# Patient Record
Sex: MALE | Race: WHITE | NOT HISPANIC OR LATINO | Employment: OTHER | URBAN - NONMETROPOLITAN AREA
[De-identification: names, ages, dates, MRNs, and addresses within clinical notes are randomized per-mention and may not be internally consistent; named-entity substitution may affect disease eponyms.]

---

## 2021-01-26 ENCOUNTER — TRANSCRIBE ORDERS (OUTPATIENT)
Dept: PHYSICAL THERAPY | Facility: CLINIC | Age: 56
End: 2021-01-26

## 2021-01-26 DIAGNOSIS — I63.9 CEREBROVASCULAR ACCIDENT (CVA), UNSPECIFIED MECHANISM (HCC): Primary | ICD-10-CM

## 2021-01-28 ENCOUNTER — OFFICE VISIT (OUTPATIENT)
Dept: PHYSICAL THERAPY | Facility: CLINIC | Age: 56
End: 2021-01-28

## 2021-01-28 DIAGNOSIS — I63.9 CEREBROVASCULAR ACCIDENT (CVA), UNSPECIFIED MECHANISM (HCC): Primary | ICD-10-CM

## 2021-01-28 PROCEDURE — 92523 SPEECH SOUND LANG COMPREHEN: CPT | Performed by: SPEECH-LANGUAGE PATHOLOGIST

## 2021-01-28 NOTE — PROGRESS NOTES
"Outpatient Speech Language Pathology   Adult Speech Language Cognitive Initial Evaluation       Patient Name: Edgardo Stein  : 1965  MRN: [id number]  Today's Date: 2021        Visit Date: 2021   There is no problem list on file for this patient.       No past medical history on file.     No past surgical history on file.      Visit Dx:    ICD-10-CM ICD-9-CM   1. Cerebrovascular accident (CVA), unspecified mechanism (CMS/[facility])  I63.9 434.91     Edgardo Stein is seen at [facility] [place] to participate in a formal s/l and cognitive evaluation to assess safety/function in adls. Pt is positioned upright in chair to cooperatively participate. Pt is seen post CVA on . Pt reports he was in surgery for hip replacement when CVA occurred. Pt remained in the hospital until discharged on . Pt recently, “two days ago”, had a procedure on his heart. Pt reports no concerns w/ eating/drinking. He reports noticing changes in comprehension, increased processing time, and word finding at conversation level. All results and observations of this evaluation are felt to be representative of pt's current functional status.    Receptive language skills are intact. Pt is able to id common objects and personal body parts, follow simple and multi-step directives, understand complex \"wh\" questions and participate in conversational exchange w/o difficulties.    Expressive language skills are mildly impaired. Pt is able to complete automatic speech tasks, complete complex oe sentences, repeat at word/sentence and multiple digit levels WFLs. Pt noted w/ mild impairment in confrontational naming tasks, responding to complete oe \"wh\" questions, and word finding at conversation level. Increased processing time needed to participate in complex conversational exchange. Mild expressive aphasia noted.     Pt is independently oriented to person, place and time. Immediate and LTM are " wfl w/ pt recalling recent adls and providing personal information w/o delays. STM mildly impaired w/ pt difficulty in recalling information from short paragraphs. Pt able to recall most information w/ increased processing time. Thought organization, processing and problem solving are mildly impaired w/ pt demonstrating difficulty in convergent and divergent thinking skills and increased processing time needed in conversation. However, pt noted w/ appropriate comparing/contrasting, understanding of idiomatic language,    Facial/oral structures are symmetrical upon observation. Oral mucosa are moist, pink and clean. Secretions are clear, thin and well controlled. Speech intensity, clarity and intelligibility are wfl w/o dysarthria or oral apraxia noted.    Graphic and reading skills are intact, premorbid baseline status. Pt is able to id isolated letters, simple and moderate words, as well as sentences and small paragraphs. Signature is legible. Pt reports he “noticed a difference in handwriting right after stroke, however has improved”.     Pragmatic skills are wfl w/ appropriate eye gaze patterns and visual tracking. Language is appropriate in context w/ topic initiation and maintenance independently. No left neglect evidenced. Humor response is intact w/ appropriate affect.    Functional Tool: Speech Language Cognitive Evaluation stimuli derived from [place], BDAE, SCATBI, Standard measures, and [facility]    Impression: Per these evaluation results, Mr. Stein presents w/ mild Expressive aphasia.     Recommendations: Pt is recommended to participate in formal tx to address processing time, deficits w/ STM, and word finding.      Long Term Goal:  1. Patient will improve expressive language skills to allow for maximal functional communication and independence in adls.        Short Term Goals:  1. Pt will improve word finding in conversation 3/5 opp w/ min cues over 3 consecutive sessions. .   2. Pt will name  objects/verbs per verbal description w/ 80% acc given min cues over 3 consecutive sessions.  3. Pt will recall information/ answer WH Q's from 3-5 sentence paragraph/conversation w/ 80% acc w/ min cues.  4. Pt will decrease processing time needed to participate in conversation w/ min cues.   5. Pt will complete convergent/divergent abstract/concrete naming/thinking tasks in 3/5 opp given min cues over 3 consecutive sessions.  6. Pt will accurately provide definition/ multiple meanings of target word w/ 80% acc w/ min cues.   *goals may be added modified pending progress towards this poc.     D/w pt results and recommendations w/ verbal understanding and agreement.    Thank you,  Lorene Irving M.A., CCC-SLP      SLP [facility] [place] [facility] - 01/28/21 1400        Communication Assessment/Intervention    Document Type  evaluation   -    Patient Observations  alert;cooperative;agree to therapy   -       Comprehension Assessment/Intervention    Comprehension Assessment/Intervention  Auditory Comprehension   -MF       Auditory Comprehension Assessment/Intervention    Auditory Comprehension (Communication)  WFL   -MF    Able to Identify Objects/Pictures (Communication)  WNL   -MF    Answers Questions (Communication)  WFL;yes/no;personal;simple   -MF    Able to Follow Commands (Communication)  2-step;body part;WFL   -MF    Narrative Discourse  simple paragraph level;conversational level;WFL   -MF       Expression Assessment/Intervention    Expression Assessment/Intervention  verbal expression   -MF       Verbal Expression Assessment/Intervention    Verbal Expression  mild impairment   -MF    Automatic Speech (Communication)  response to greeting;counting 1-20;WNL   -MF    Repetition  WNL   -MF    Phrase Completion  WNL   -MF    Responsive Naming  WFL   -MF    Confrontational Naming  mild impairment   -MF    Spontaneous/Functional Words  WFL   -MF    Sentence Formulation  mild impairment   -MF    Conversational Discourse/Fluency   WFL   -MF    Verbal Expression, Comment  mild impairment w/ word finding, and STM   -MF      User Key  (r) = Recorded By, (t) = Taken By, (c) = Cosigned By    Initials Name Provider Type    Lorene Kelley CCC-SLP Speech and Language Pathologist              OP SLP Assessment/Plan - 01/28/21 1400        SLP Assessment    Functional Problems  Speech Language- Adult/Cognition   -    Impact on Function: Adult Speech Language/Cognition  Difficulty communicating wants, needs, and ideas;Difficulty communicating in a medical emergency;Restrictions in personal and social life;Difficulty sequencing thoughts to express complex messages;Difficulty sequencing or problem solving to complete ADLs   -    Clinical Impression: Speech Language-Adult/Congnition  Mild:;Expressive Aphasia   -MF    Please refer to paper survey for additional self-reported information  Yes   -MF    Please refer to items scanned into chart for additional diagnostic informaiton and handouts as provided by clinician  Yes   -MF    SLP Diagnosis  Mild:;Expressive Aphasia   -MF    Prognosis  Good (comment)   -MF    Patient/caregiver participated in establishment of treatment plan and goals  Yes   -MF    Patient would benefit from skilled therapy intervention  Yes   -MF       SLP Plan    Frequency  12 visits   -    Duration  x12 weeks   -MF    Planned CPT's?  SLP INDIVIDUAL SPEECH THERAPY: 49836   -    Plan Comments  Evaluation Complete, Initiate POC   -MF      User Key  (r) = Recorded By, (t) = Taken By, (c) = Cosigned By    Initials Name Provider Type    Lorene Kelley CCC-SLP Speech and Language Pathologist              Lorene Irving CCC-DEYVI  1/28/2021

## 2021-02-03 ENCOUNTER — TREATMENT (OUTPATIENT)
Dept: PHYSICAL THERAPY | Facility: CLINIC | Age: 56
End: 2021-02-03

## 2021-02-03 DIAGNOSIS — I63.9 CEREBROVASCULAR ACCIDENT (CVA), UNSPECIFIED MECHANISM (HCC): Primary | ICD-10-CM

## 2021-02-03 PROCEDURE — 92507 TX SP LANG VOICE COMM INDIV: CPT | Performed by: SPEECH-LANGUAGE PATHOLOGIST

## 2021-02-03 NOTE — PROGRESS NOTES
"Outpatient Speech Language Pathology   Adult Speech Language Cognitive Treatment Note       Patient Name: Edgardo Stein  : 1965  MRN: [id number]  Today's Date: 2/3/2021         Visit Date: 2021   There is no problem list on file for this patient.         Visit Dx:    ICD-10-CM ICD-9-CM   1. Cerebrovascular accident (CVA), unspecified mechanism (CMS/HCC)  I63.9 434.91     Long Term Goal:  1. Patient will improve expressive language skills to allow for maximal functional communication and independence in adls.          Short Term Goals:  1. Pt will improve word finding in conversation 3/5 opp w/ min cues over 3 consecutive sessions.    *Pt improves word finding in structured therapy activities in 3/5 opp w/ mod cues. Pt notes difficulty w/ conversation \"occasionally\".    2. Pt will name objects/verbs per verbal description w/ 80% acc given min cues over 3 consecutive sessions.   *Pt named object per verbal description w/ 75% w/ min cues.     3. Pt will recall information/ answer WH Q's from 3-5 sentence paragraph/conversation w/ 80% acc w/ min cues.   *Pt recalled information to answer WH Q's from 3/5 sentence paragraph w/ 70% acc w/ min-mod cues.     4. Pt will decrease processing time needed to participate in conversation w/ min cues.    *Pt decreased processing time at conversation level w/ mod cues. Pt reports \"it is getting easier\".    5. Pt will complete convergent/divergent abstract/concrete naming/thinking tasks in 3/5 opp given min cues over 3 consecutive sessions.   *Pt completed convergent/divergent naming tasks w/ broad category 13 words in 30 seconds, narrow category 6 in 30 seconds w/ mod cues.     6. Pt will accurately provide definition/ multiple meanings of target word w/ 80% acc w/ min cues.    *Pt provided definition/ multiple meanings of target words w/ 90% acc w/ min cues    *goals may be added modified pending progress towards this poc.     D/w pt progress made this session w/ " verbal understanding and agreement. Pt encouraged to watch tv and try to retell the story to  to increase ability to recall information/ STM.     Thank you,      Lorene Irving CCC-SLP  2/3/2021

## 2021-02-10 ENCOUNTER — TREATMENT (OUTPATIENT)
Dept: PHYSICAL THERAPY | Facility: CLINIC | Age: 56
End: 2021-02-10

## 2021-02-10 DIAGNOSIS — I63.9 CEREBROVASCULAR ACCIDENT (CVA), UNSPECIFIED MECHANISM (HCC): Primary | ICD-10-CM

## 2021-02-10 PROCEDURE — 92507 TX SP LANG VOICE COMM INDIV: CPT | Performed by: SPEECH-LANGUAGE PATHOLOGIST

## 2021-02-10 NOTE — PROGRESS NOTES
"Outpatient Speech Language Pathology   Adult Speech Language Cognitive Treatment Note       Patient Name: Edgardo Stein  : 1965  MRN: [id number]  Today's Date: 2/10/2021         Visit Date: 02/10/2021   There is no problem list on file for this patient.         Visit Dx:    ICD-10-CM ICD-9-CM   1. Cerebrovascular accident (CVA), unspecified mechanism (CMS/HCC)  I63.9 434.91     Long Term Goal:  1. Patient will improve expressive language skills to allow for maximal functional communication and independence in adls.           Short Term Goals:  1. Pt will improve word finding in conversation 3/5 opp w/ min cues over 3 consecutive sessions.               *Pt improves word finding in structured therapy activities in 3/5 opp w/ mod cues. Pt notes difficulty w/ conversation \"occasionally\".     2. Pt will name objects/verbs per verbal description w/ 80% acc given min cues over 3 consecutive sessions. (1 session)              *Pt named object per verbal description w/ 95% w/ min cues.      3. Pt will recall information/ answer WH Q's from 3-5 sentence paragraph/conversation w/ 80% acc w/ min cues.              *Pt recalled information to answer WH Q's from 3/5 sentence paragraph w/ 70% acc w/ min-mod cues. Pt often uses written words/ pictures to help remember key details of story/ advertisement. Pt reports \"reading comprehension was never something I was great with\".      4. Pt will decrease processing time needed to participate in conversation w/ min cues.               *Pt decreased processing time at conversation level w/ mod cues. Pt reports \"it is getting easier\".     5. Pt will complete convergent/divergent abstract/concrete naming/thinking tasks in 3/5 opp given min cues over 3 consecutive sessions.              *Pt completed convergent/divergent naming tasks w/ broad category 15 words in 30 seconds, narrow category 12 in 30 seconds w/ min cues.      6. Pt will accurately provide definition/ multiple " meanings of target word w/ 80% acc w/ min cues. (2 sessions)              *Pt provided definition/ multiple meanings of target words w/ 90% acc w/ min cues     *goals may be added modified pending progress towards this poc.     D/w pt progress made this session w/ verbal understanding and agreement. Pt encouraged to watch tv and try to retell the story to  to increase ability to recall information/ STM.     Thank you,           Lorene Irving CCC-SLP  2/10/2021

## 2021-02-24 ENCOUNTER — TREATMENT (OUTPATIENT)
Dept: PHYSICAL THERAPY | Facility: CLINIC | Age: 56
End: 2021-02-24

## 2021-02-24 DIAGNOSIS — I63.9 CEREBROVASCULAR ACCIDENT (CVA), UNSPECIFIED MECHANISM (HCC): Primary | ICD-10-CM

## 2021-02-24 PROCEDURE — 92507 TX SP LANG VOICE COMM INDIV: CPT | Performed by: SPEECH-LANGUAGE PATHOLOGIST

## 2021-02-24 NOTE — PROGRESS NOTES
"Outpatient Speech Language Pathology   Peds Speech Language Treatment Note       Patient Name: Edgardo Stein  : 1965  MRN: [id number]  Today's Date: 2021      Visit Date: 2021    There is no problem list on file for this patient.      Visit Dx:    ICD-10-CM ICD-9-CM   1. Cerebrovascular accident (CVA), unspecified mechanism (CMS/HCC)  I63.9 434.91     Long Term Goal:  1. Patient will improve expressive language skills to allow for maximal functional communication and independence in adls.           Short Term Goals:  1. Pt will improve word finding in conversation 3/5 opp w/ min cues over 3 consecutive sessions. (1 session)              *Pt improves word finding in structured therapy activities in 4/5 opp w/ min cues. Pt notes increase in ability to process conversations and respond quickly. He reports \"I'm able to respond faster\".     2. Pt will name objects/verbs per verbal description w/ 80% acc given min cues over 3 consecutive sessions. (2 session)              *Pt named object per verbal description w/ 95% w/ min cues.      3. Pt will recall information/ answer WH Q's from 3-5 sentence paragraph/conversation w/ 80% acc w/ min cues.              *Pt recalled information to answer WH Q's from 3/5 sentence paragraph w/ 75% acc w/ min-mod cues. Pt often uses written words/ pictures to help remember key details of story/advertisement. Pt reports \"I can remember better when I read the paragraph myself\".      4. Pt will decrease processing time needed to participate in conversation w/ min cues. (1 session)              *Pt decreased processing time at conversation level w/ min cues. Pt notes increase in ability to process conversations and respond quickly. He reports \"I'm able to respond faster\".     5. Pt will complete convergent/divergent abstract/concrete naming/thinking tasks in 3/5 opp given min cues over 3 consecutive sessions.              *Pt completed convergent/divergent naming tasks w/ " broad category 15 words in 30 seconds, narrow category 15 in 30 seconds w/ min cues.      6. Pt will accurately provide definition/ multiple meanings of target word w/ 80% acc w/ min cues. (GOAL MET)              *Pt provided definition/ multiple meanings of target words w/ 90% acc w/ min cues     *goals may be added modified pending progress towards this poc.     D/w pt progress made this session w/ verbal understanding and agreement. Pt encouraged to watch tv and try to retell the story to  to increase ability to recall information/ STM.     Thank you,        Lorene Irving, CCC-SLP  2/24/2021

## 2021-03-03 ENCOUNTER — TREATMENT (OUTPATIENT)
Dept: PHYSICAL THERAPY | Facility: CLINIC | Age: 56
End: 2021-03-03

## 2021-03-03 DIAGNOSIS — I63.9 CEREBROVASCULAR ACCIDENT (CVA), UNSPECIFIED MECHANISM (HCC): Primary | ICD-10-CM

## 2021-03-03 PROCEDURE — 92507 TX SP LANG VOICE COMM INDIV: CPT | Performed by: SPEECH-LANGUAGE PATHOLOGIST

## 2021-03-03 NOTE — PROGRESS NOTES
"Outpatient Speech Language Pathology   Adult Speech Language Cognitive Progress Note       Patient Name: Edgardo Stein  : 1965  MRN: [id number]  Today's Date: 3/3/2021         Visit Date: 2021   There is no problem list on file for this patient.         Visit Dx:    ICD-10-CM ICD-9-CM   1. Cerebrovascular accident (CVA), unspecified mechanism (CMS/HCC)  I63.9 434.91     Long Term Goal:  1. Patient will improve expressive language skills to allow for maximal functional communication and independence in adls.           Short Term Goals:  1. Pt will improve word finding in conversation 3/5 opp w/ min cues over 3 consecutive sessions. (2 session)              *Pt improves word finding in structured therapy activities in 4/5 opp w/ min cues. Pt notes increase in ability to process conversations and respond quickly. He reports \"I'm able to respond faster\".     2. Pt will name objects/verbs per verbal description w/ 80% acc given min cues over 3 consecutive sessions. (GOAL MET)              *Pt named object per verbal description w/ 95% w/ min cues.      3. Pt will recall information/ answer WH Q's from 3-5 sentence paragraph/conversation w/ 80% acc w/ min cues.              *Pt recalled information to answer WH Q's from 3/5 sentence paragraph w/ 80% acc w/ min-mod cues. Pt often uses written words/ pictures to help remember key details of story/advertisement. Pt reports \"I can remember better when I read the paragraph myself\".      4. Pt will decrease processing time needed to participate in conversation w/ min cues. (2 session)              *Pt decreased processing time at conversation level w/ min cues. Pt notes increase in ability to process conversations and respond quickly. He reports \"I'm able to respond faster\".     5. Pt will complete convergent/divergent abstract/concrete naming/thinking tasks in 3/5 opp given min cues over 3 consecutive sessions. (2 sessions)              *Pt completed " convergent/divergent naming tasks w/ broad category 15 words in 30 seconds, narrow category 15 in 30 seconds w/ min cues.        *goals may be added modified pending progress towards this poc.     D/w pt progress made this session w/ verbal understanding and agreement. Pt encouraged to watch tv and try to retell the story to  to increase ability to recall information/ STM.     Thank you,        SLP [facility] Evaluation - 03/03/21 0900        Communication Assessment/Intervention    Patient Observations  alert;cooperative;agree to therapy   -MF       Comprehension Assessment/Intervention    Comprehension Assessment/Intervention  Auditory Comprehension   -MF       Auditory Comprehension Assessment/Intervention    Auditory Comprehension (Communication)  WFL   -MF    Able to Identify Objects/Pictures (Communication)  WNL   -MF    Answers Questions (Communication)  yes/no;wh questions;personal;WFL   -MF    Able to Follow Commands (Communication)  2-step;3-step;body part;objects;WFL   -MF    Narrative Discourse  simple paragraph level;conversational level;WFL   -MF       Expression Assessment/Intervention    Expression Assessment/Intervention  verbal expression   -MF       Verbal Expression Assessment/Intervention    Verbal Expression  mild impairment   -MF    Automatic Speech (Communication)  response to greeting;alphabet;counting 1-20;WNL   -MF    Repetition  WNL   -MF    Phrase Completion  WNL   -MF    Responsive Naming  WNL   -MF    Confrontational Naming  WFL   -MF    Spontaneous/Functional Words  WFL   -MF    Sentence Formulation  mild impairment   -MF    Conversational Discourse/Fluency  WFL   -MF    Verbal Expression, Comment  mild impairment w/ word finding, and STM   -MF      User Key  (r) = Recorded By, (t) = Taken By, (c) = Cosigned By    Initials Name Provider Type    Lorene Kelley CCC-SLP Speech and Language Pathologist              OP SLP Assessment/Plan - 03/03/21 0900        SLP Assessment     Functional Problems  Speech Language- Adult/Cognition   -    Impact on Function: Adult Speech Language/Cognition  Difficulty communicating wants, needs, and ideas;Difficulty communicating in a medical emergency;Restrictions in personal and social life;Difficulty sequencing thoughts to express complex messages;Difficulty sequencing or problem solving to complete ADLs   -    Clinical Impression: Speech Language-Adult/Congnition  Mild:;Expressive Aphasia   -MF    Please refer to paper survey for additional self-reported information  Yes   -MF    Please refer to items scanned into chart for additional diagnostic informaiton and handouts as provided by clinician  Yes   -MF    SLP Diagnosis  Mild:;Expressive Aphasia   -    Prognosis  Good (comment)   -MF    Patient/caregiver participated in establishment of treatment plan and goals  Yes   -MF    Patient would benefit from skilled therapy intervention  Yes   -MF       SLP Plan    Frequency  12 visits   -    Duration  x12 weeks   -    Planned CPT's?  SLP INDIVIDUAL SPEECH THERAPY: 71044   -    Plan Comments  Cont w/ POC   -      User Key  (r) = Recorded By, (t) = Taken By, (c) = Cosigned By    Initials Name Provider Type     Lorene Irving CCC-SLP Speech and Language Pathologist              Lorene Irving CCC-DEYVI  3/3/2021

## 2021-03-10 ENCOUNTER — TREATMENT (OUTPATIENT)
Dept: PHYSICAL THERAPY | Facility: CLINIC | Age: 56
End: 2021-03-10

## 2021-03-10 DIAGNOSIS — I63.9 CEREBROVASCULAR ACCIDENT (CVA), UNSPECIFIED MECHANISM (HCC): Primary | ICD-10-CM

## 2021-03-10 PROCEDURE — 92507 TX SP LANG VOICE COMM INDIV: CPT | Performed by: SPEECH-LANGUAGE PATHOLOGIST

## 2021-03-10 NOTE — PROGRESS NOTES
"Outpatient Speech Language Pathology   Adult Speech Language Cognitive Treatment Note/Discharge Summary       Patient Name: Edgardo Stein  : 1965  MRN: [id number]  Today's Date: 3/10/2021         Visit Date: 03/10/2021   There is no problem list on file for this patient.         Visit Dx:    ICD-10-CM ICD-9-CM   1. Cerebrovascular accident (CVA), unspecified mechanism (CMS/HCC)  I63.9 434.91     Long Term Goal:  1. Patient will improve expressive language skills to allow for maximal functional communication and independence in adls.           Short Term Goals:  1. Pt will improve word finding in conversation 3/5 opp w/ min cues over 3 consecutive sessions. (GOAL MET)              *Pt improves word finding in structured therapy activities in 4/5 opp w/ min cues. Pt notes increase in ability to process conversations and respond quickly. He reports \"I'm able to respond faster\".     2. Pt will name objects/verbs per verbal description w/ 80% acc given min cues over 3 consecutive sessions. (GOAL MET)              *Pt named object per verbal description w/ 95% w/ min cues.      3. Pt will recall information/ answer WH Q's from 3-5 sentence paragraph/conversation w/ 80% acc w/ min cues. (1 session)              *Pt recalled information to answer WH Q's from 3/5 sentence paragraph w/ 85% acc w/ min cues. Pt often uses written words/ pictures to help remember key details of story/advertisement.     4. Pt will decrease processing time needed to participate in conversation w/ min cues. (GOAL MET)              *Pt decreased processing time at conversation level w/ min cues. Pt notes increase in ability to process conversations and respond quickly. He reports \"I'm able to respond faster\". He reports his only concern at this time is math. However, he reports it is getting better and everything else is at baseline.      5. Pt will complete convergent/divergent abstract/concrete naming/thinking tasks in 3/5 opp given min " cues over 3 consecutive sessions. (GOAL MET)              *Pt completed convergent/divergent naming tasks w/ broad category 15 words in 30 seconds, narrow category 15 in 30 seconds w/ min cues.         *goals may be added modified pending progress towards this poc.     D/w pt progress made this session w/ verbal understanding and agreement. Pt discharged at this time 2/2 progress on meeting all goals. Pt reports his only concern at this time is math. However, he reports it is getting better and everything else is at baseline.       Thank you,      Lorene Irving CCC-SLP  3/10/2021